# Patient Record
Sex: MALE | Race: WHITE | NOT HISPANIC OR LATINO | Employment: UNEMPLOYED | URBAN - METROPOLITAN AREA
[De-identification: names, ages, dates, MRNs, and addresses within clinical notes are randomized per-mention and may not be internally consistent; named-entity substitution may affect disease eponyms.]

---

## 2024-03-03 ENCOUNTER — HOSPITAL ENCOUNTER (EMERGENCY)
Facility: HOSPITAL | Age: 49
Discharge: HOME | End: 2024-03-03
Attending: EMERGENCY MEDICINE

## 2024-03-03 VITALS
RESPIRATION RATE: 20 BRPM | DIASTOLIC BLOOD PRESSURE: 104 MMHG | TEMPERATURE: 98.6 F | OXYGEN SATURATION: 97 % | WEIGHT: 163.3 LBS | SYSTOLIC BLOOD PRESSURE: 176 MMHG | HEART RATE: 87 BPM

## 2024-03-03 DIAGNOSIS — K08.89 PAIN, DENTAL: Primary | ICD-10-CM

## 2024-03-03 PROCEDURE — 99282 EMERGENCY DEPT VISIT SF MDM: CPT

## 2024-03-03 RX ORDER — BUPIVACAINE HYDROCHLORIDE AND EPINEPHRINE 5; 5 MG/ML; UG/ML
1.8 INJECTION, SOLUTION EPIDURAL; INTRACAUDAL; PERINEURAL ONCE
Status: COMPLETED | OUTPATIENT
Start: 2024-03-03 | End: 2024-03-03

## 2024-03-03 RX ORDER — OXYCODONE HYDROCHLORIDE AND ACETAMINOPHEN 5; 325 MG/1; MG/1
1 TABLET ORAL EVERY 6 HOURS PRN
Qty: 20 TABLET | Refills: 0 | Status: SHIPPED | OUTPATIENT
Start: 2024-03-03 | End: 2024-03-08

## 2024-03-03 RX ADMIN — BUPIVACAINE HYDROCHLORIDE AND EPINEPHRINE BITARTRATE 1.8 ML: 5; .005 INJECTION, SOLUTION SUBCUTANEOUS at 17:49

## 2024-03-03 NOTE — ED PROVIDER NOTES
History  Chief Complaint   Patient presents with    Dental Pain     Broken left lower molar. Took 800mg Ibuprofen at 330pm     48 year old male, 6 months of left lower molar pain, worse over last few days due to chip, taking 800 mg of motrin and 1000mg of tylenol. Saw dentist 6 months agoe but was unable to follow up.       Dental Pain  Associated symptoms: no fever and no neck pain        None       History reviewed. No pertinent past medical history.    History reviewed. No pertinent surgical history.    History reviewed. No pertinent family history.  I have reviewed and agree with the history as documented.    E-Cigarette/Vaping     E-Cigarette/Vaping Substances     Social History     Tobacco Use    Smoking status: Every Day     Current packs/day: 1.00     Types: Cigarettes    Smokeless tobacco: Never   Substance Use Topics    Alcohol use: Never    Drug use: Never       Review of Systems   Constitutional: Negative.  Negative for chills and fever.   HENT:  Positive for dental problem. Negative for rhinorrhea, sore throat, trouble swallowing and voice change.    Eyes: Negative.  Negative for pain and visual disturbance.   Respiratory: Negative.  Negative for cough, shortness of breath and wheezing.    Cardiovascular: Negative.  Negative for chest pain and palpitations.   Gastrointestinal:  Negative for abdominal pain, diarrhea, nausea and vomiting.   Genitourinary: Negative.  Negative for dysuria and frequency.   Musculoskeletal: Negative.  Negative for neck pain and neck stiffness.   Skin: Negative.  Negative for rash.   Neurological: Negative.  Negative for dizziness, speech difficulty, weakness, light-headedness and numbness.       Physical Exam  Physical Exam  Vitals and nursing note reviewed.   Constitutional:       General: He is not in acute distress.     Appearance: He is well-developed.   HENT:      Head: Normocephalic and atraumatic.      Mouth/Throat:      Lips: Pink.      Mouth: Mucous membranes are  moist. No lacerations or angioedema.      Dentition: Dental tenderness and dental caries present.      Tongue: Lesions present.      Pharynx: Oropharynx is clear.      Tonsils: No tonsillar exudate or tonsillar abscesses.     Eyes:      Conjunctiva/sclera: Conjunctivae normal.      Pupils: Pupils are equal, round, and reactive to light.   Neck:      Trachea: No tracheal deviation.   Cardiovascular:      Rate and Rhythm: Normal rate and regular rhythm.   Pulmonary:      Effort: Pulmonary effort is normal. No respiratory distress.      Breath sounds: Normal breath sounds. No wheezing or rales.   Abdominal:      General: Bowel sounds are normal. There is no distension.      Palpations: Abdomen is soft.      Tenderness: There is no abdominal tenderness. There is no guarding or rebound.   Musculoskeletal:         General: No tenderness or deformity. Normal range of motion.      Cervical back: Normal range of motion and neck supple.   Skin:     General: Skin is warm and dry.      Capillary Refill: Capillary refill takes less than 2 seconds.      Findings: No rash.   Neurological:      Mental Status: He is alert and oriented to person, place, and time.   Psychiatric:         Behavior: Behavior normal.         Vital Signs  ED Triage Vitals [03/03/24 1727]   Temperature Pulse Respirations Blood Pressure SpO2   98.6 °F (37 °C) 87 20 (!) 176/104 97 %      Temp Source Heart Rate Source Patient Position - Orthostatic VS BP Location FiO2 (%)   Tympanic Monitor Sitting Left arm --      Pain Score       --           Vitals:    03/03/24 1727   BP: (!) 176/104   Pulse: 87   Patient Position - Orthostatic VS: Sitting         Visual Acuity      ED Medications  Medications   bupivacaine-epinephrine (PF) (MARCAINE/EPINEPHRINE PF) 0.5 %-1:[id number] injection 1.8 mL (1.8 mL Injection Given 3/3/24 1749)       Diagnostic Studies  Results Reviewed       None                   No orders to display              Procedures  Nerve  "block    Date/Time: 3/3/2024 6:09 PM    Performed by: Je Francois DO  Authorized by: Je Francois DO    Patient location:  ED  [place] Protocol:  Consent: Verbal consent obtained. Written consent not obtained.  Risks and benefits: risks, benefits and alternatives were discussed  Consent given by: patient  Time out: Immediately prior to procedure a \"time out\" was called to verify the correct patient, procedure, equipment, support staff and site/side marked as required.  Patient understanding: patient states understanding of the procedure being performed  Relevant documents: relevant documents present and verified  Test results: test results available and properly labeled  Radiology Images displayed and confirmed. If images not available, report reviewed: imaging studies available  Required items: required blood products, implants, devices, and special equipment available  Patient identity confirmed: verbally with patient    Indications:     Indications:  Pain relief  Location:     Body area:  Head    Head nerve blocked: inferior alveolar nerve.    Nerve type:  Peripheral    Laterality:  Left  Skin anesthesia (see MAR for exact dosages):     Skin anesthesia method:  Topical application    Topical anesthetic:  Lidocaine gel  Procedure details (see MAR for exact dosages):     Block needle gauge:  25 G    Anesthetic injected:  Bupivacaine 0.25% WITH epi    Steroid injected:  None    Additive injected:  None    Injection procedure:  Anatomic landmarks identified, incremental injection, negative aspiration for blood, anatomic landmarks palpated and introduced needle  Post-procedure details:     Dressing:  None    Outcome:  Anesthesia achieved    Patient tolerance of procedure:  Tolerated well, no immediate complications           ED Course                               SBIRT 20yo+      Flowsheet Row Most Recent Value   Initial Alcohol Screen: US AUDIT-C     1. How often do you have a drink containing " alcohol? 0 Filed at: 03/03/2024 1730   2. How many drinks containing alcohol do you have on a typical day you are drinking?  0 Filed at: 03/03/2024 1730   3a. Male UNDER 65: How often do you have five or more drinks on one occasion? 0 Filed at: 03/03/2024 1730   3b. FEMALE Any Age, or MALE 65+: How often do you have 4 or more drinks on one occassion? 0 Filed at: 03/03/2024 1730   Audit-C Score 0 Filed at: 03/03/2024 1730   TRACEY: How many times in the past year have you...    Used an illegal drug or used a prescription medication for non-medical reasons? Never Filed at: 03/03/2024 1730                      Medical Decision Making  No obvious airway obstruction or concern for deep space infection. Dental block preformed. Will start on stronger analgesics, Given referral to dentist or can follow up with his dentist from 6 months ago. Return precautions reviewed.     Risk  Prescription drug management.             Disposition  Final diagnoses:   Pain, dental     Time reflects when diagnosis was documented in both MDM as applicable and the Disposition within this note       Time User Action Codes Description Comment    3/3/2024  5:52 PM Je Francois Add [K08.89] Pain, dental           ED Disposition       ED Disposition   Discharge    Condition   Stable    Date/Time   Sun Mar 3, 2024 1752    Comment   Jamil Covarrubias discharge to home/self care.                   Follow-up Information    None         Patient's Medications   Discharge Prescriptions    OXYCODONE-ACETAMINOPHEN (PERCOCET) 5-325 MG PER TABLET    Take 1 tablet by mouth every 6 (six) hours as needed for moderate pain for up to 5 days Max Daily Amount: 4 tablets       Start Date: 3/3/2024  End Date: 3/8/2024       Order Dose: 1 tablet       Quantity: 20 tablet    Refills: 0           PDMP Review       None            ED Provider  Electronically Signed by             Je Francois DO  03/03/24 4637

## 2024-03-03 NOTE — Clinical Note
Jamil Covarrubias was seen and treated in our emergency department on 3/3/2024.                Diagnosis:     Jamil  .    He may return on this date: 03/07/2024         If you have any questions or concerns, please don't hesitate to call.      Je Francois, DO    ______________________________           _______________          _______________  Hospital Representative                              Date                                Time

## 2024-03-04 ENCOUNTER — OFFICE VISIT (OUTPATIENT)
Dept: DENTISTRY | Facility: CLINIC | Age: 49
End: 2024-03-04

## 2024-03-04 VITALS — HEART RATE: 97 BPM | DIASTOLIC BLOOD PRESSURE: 88 MMHG | SYSTOLIC BLOOD PRESSURE: 135 MMHG

## 2024-03-04 DIAGNOSIS — Z01.20 ENCOUNTER FOR DENTAL EXAMINATION: Primary | ICD-10-CM

## 2024-03-04 PROCEDURE — D0220 INTRAORAL - PERIAPICAL FIRST RADIOGRAPHIC IMAGE: HCPCS

## 2024-03-04 PROCEDURE — D0140 LIMITED ORAL EVALUATION - PROBLEM FOCUSED: HCPCS

## 2024-03-04 NOTE — DENTAL PROCEDURE DETAILS
Patient presents for limited dental evaluation.    Patient was seen in ED yesterday for dental pain.    PMH reviewed, patient reports no medical conditions. Patient is a smoker about 1 pack a day.    CC - Lower left pain, points to #18    Clinical Findings  #18 MOBL cavitation, nonrestorable hopeless carious tooth; percussion and palpation tender;  PA radiograph #18--large caries, PARL  Other teeth with caries  Other missing teeth  Severe dental crowding  Generalized calculus and plaque and gingival inflammation  Generalized black/brown enamel staining  Soft tissue/oral cancer screening-Normal    Plan: EXT #18 and comp exam to address other needs.    Patient informed of findings and plan to extract #18 and comp exam for other needs. Patient informed of other treatment needs. Patient informed to continue taking antibiotics to completion. Patient informed that he will need to stop smoking for at least 2 days following the tooth extraction to avoid delayed healing/dry socket. Patient understood and agreed.    NV: EXT #18  NV2: Comp exam

## 2024-09-20 ENCOUNTER — OCCMED (OUTPATIENT)
Dept: URGENT CARE | Age: 49
End: 2024-09-20
Payer: OTHER MISCELLANEOUS

## 2024-09-20 ENCOUNTER — APPOINTMENT (OUTPATIENT)
Dept: RADIOLOGY | Age: 49
End: 2024-09-20
Payer: OTHER MISCELLANEOUS

## 2024-09-20 DIAGNOSIS — S69.91XA THUMB INJURY, RIGHT, INITIAL ENCOUNTER: Primary | ICD-10-CM

## 2024-09-20 DIAGNOSIS — S69.91XA THUMB INJURY, RIGHT, INITIAL ENCOUNTER: ICD-10-CM

## 2024-09-20 PROCEDURE — 99283 EMERGENCY DEPT VISIT LOW MDM: CPT

## 2024-09-20 PROCEDURE — 73130 X-RAY EXAM OF HAND: CPT

## 2024-09-20 PROCEDURE — G0382 LEV 3 HOSP TYPE B ED VISIT: HCPCS

## 2024-09-24 ENCOUNTER — APPOINTMENT (OUTPATIENT)
Age: 49
End: 2024-09-24
Payer: OTHER MISCELLANEOUS

## 2024-09-24 PROCEDURE — 99214 OFFICE O/P EST MOD 30 MIN: CPT | Performed by: STUDENT IN AN ORGANIZED HEALTH CARE EDUCATION/TRAINING PROGRAM

## 2024-10-01 ENCOUNTER — APPOINTMENT (OUTPATIENT)
Age: 49
End: 2024-10-01
Payer: OTHER MISCELLANEOUS

## 2024-10-01 PROCEDURE — 99213 OFFICE O/P EST LOW 20 MIN: CPT | Performed by: STUDENT IN AN ORGANIZED HEALTH CARE EDUCATION/TRAINING PROGRAM

## 2024-10-15 ENCOUNTER — APPOINTMENT (OUTPATIENT)
Age: 49
End: 2024-10-15
Payer: OTHER MISCELLANEOUS

## 2024-10-15 PROCEDURE — 99214 OFFICE O/P EST MOD 30 MIN: CPT | Performed by: STUDENT IN AN ORGANIZED HEALTH CARE EDUCATION/TRAINING PROGRAM

## 2024-11-05 ENCOUNTER — APPOINTMENT (OUTPATIENT)
Age: 49
End: 2024-11-05
Payer: OTHER MISCELLANEOUS

## 2024-11-05 PROCEDURE — 99214 OFFICE O/P EST MOD 30 MIN: CPT | Performed by: STUDENT IN AN ORGANIZED HEALTH CARE EDUCATION/TRAINING PROGRAM

## 2024-11-13 ENCOUNTER — OFFICE VISIT (OUTPATIENT)
Dept: OBGYN CLINIC | Facility: CLINIC | Age: 49
End: 2024-11-13
Payer: OTHER MISCELLANEOUS

## 2024-11-13 VITALS — SYSTOLIC BLOOD PRESSURE: 100 MMHG | WEIGHT: 163 LBS | DIASTOLIC BLOOD PRESSURE: 60 MMHG

## 2024-11-13 DIAGNOSIS — S63.681A OTHER SPRAIN OF RIGHT THUMB, INITIAL ENCOUNTER: Primary | ICD-10-CM

## 2024-11-13 PROCEDURE — 99213 OFFICE O/P EST LOW 20 MIN: CPT | Performed by: SURGERY

## 2024-11-13 NOTE — PROGRESS NOTES
Assessment    Right thumb MCP sprain.  Clinically no tear of the RCL, and intact ligament visualized on MRI.      Plan    -At this point, recommend weaning out of thumb spica brace and starting more aggressive motion and normal daily activities, and increase as able.  No specific restrictions.  -Referral to OT/hand therapy to assist with this.  -Over the counter pain medications as needed.  -Work note provided.  -Follow-up in 4 weeks for reevaluation.        Subjective     HPI    Patient ID:  Jamil Covarrubias is a right-hand-dominant 49 y.o. male here for evaluation of the right thumb.  According to the patient, about 2 months ago he was moving branches and 1 fell, hyperextending the right thumb.  He felt immediate pain and swelling.  He eventually had x-rays showing no acute fracture or dislocation, and he was placed in a thumb spica brace.  Over this timeframe his pain persisted and in MRI of the right thumb was ordered through occupational medicine.  It revealed a tear in the thumb and he was referred here.  He states today he has pain in the thumb MCP region as well as the radial and dorsal wrist.  He has been wearing the thumb spica brace consistently for about 20 hours/day.  He is taking over-the-counter pain medications as needed consistently as well.  His pain is about 6/10 on a daily basis.  No associated numbness and tingling.  No history of surgery to the right thumb.      The following portions of the patient's history were reviewed and updated as appropriate: allergies, current medications, past family history, past medical history, past social history, past surgical history, and problem list.    Review of Systems     Objective    Imaging:   MRI Right thumb without contrast 10/31/2024:  RCL tear     Right thumb x-rays 9/20/2024    FINDINGS:     No acute fracture or dislocation.     No significant degenerative changes.     No lytic or blastic osseous lesion.     Unremarkable soft tissues.      IMPRESSION:     No acute osseous abnormality.    Physical Exam     Vitals:    11/13/24 1021   BP: 100/60       General appearance:  NAD   Cardiac:  Regular rate  Lungs:  Unlabored breathing  Abdomen:  Non-distended    Orthopedic Examination:  Right thumb    Inspection: No open wounds or erythema.  No ecchymosis or swelling.  No obvious bony abnormality.    Palpation: Mild tenderness to palpation RCL region, and to a lesser extent UCL.  There is some tenderness first dorsal extensor compartment as well as second dorsal compartment.    Range-of-motion:  + Active thumb flexion extension    Strength:  5/5     Sensation:  SILT radial and ulnar side of the thumb.      Special Tests:  Palpable radial pulse  No laxity with stress of RCL/UCL.

## 2024-11-13 NOTE — LETTER
November 13, 2024     Patient: Jamil Covrarubias  YOB: 1975  Date of Visit: 11/13/2024      To Whom it May Concern:    Jamil Covarrubias is under my professional care. Jamil was seen in my office on 11/13/2024. Jamil is to continue current work restrictions until next visit in 4 weeks.  Further updates will be provided then.      If you have any questions or concerns, please don't hesitate to call.         Sincerely,          Vinny Lange MD        CC: No Recipients

## 2024-11-14 ENCOUNTER — APPOINTMENT (OUTPATIENT)
Age: 49
End: 2024-11-14
Payer: OTHER MISCELLANEOUS

## 2024-11-14 PROCEDURE — 99214 OFFICE O/P EST MOD 30 MIN: CPT | Performed by: STUDENT IN AN ORGANIZED HEALTH CARE EDUCATION/TRAINING PROGRAM

## 2024-11-18 ENCOUNTER — TELEPHONE (OUTPATIENT)
Age: 49
End: 2024-11-18

## 2024-11-18 NOTE — TELEPHONE ENCOUNTER
Caller: Ren millan PT    Doctor/Office: Nazario BARTLETT#:       What needs to be faxed: 11/13 OVN    ATTN to: Ren    Fax#: [phone]      Documents were successfully e-faxed

## 2024-12-11 ENCOUNTER — OFFICE VISIT (OUTPATIENT)
Dept: OBGYN CLINIC | Facility: CLINIC | Age: 49
End: 2024-12-11
Payer: OTHER MISCELLANEOUS

## 2024-12-11 VITALS — DIASTOLIC BLOOD PRESSURE: 60 MMHG | SYSTOLIC BLOOD PRESSURE: 116 MMHG | WEIGHT: 163 LBS

## 2024-12-11 DIAGNOSIS — S63.641D SPRAIN OF METACARPOPHALANGEAL (MCP) JOINT OF RIGHT THUMB, SUBSEQUENT ENCOUNTER: Primary | ICD-10-CM

## 2024-12-11 PROCEDURE — 99213 OFFICE O/P EST LOW 20 MIN: CPT | Performed by: SURGERY

## 2024-12-11 RX ORDER — FAMOTIDINE 40 MG/1
40 TABLET, FILM COATED ORAL DAILY
COMMUNITY
End: 2024-12-16

## 2024-12-11 RX ORDER — IBUPROFEN 200 MG
200 TABLET ORAL DAILY
COMMUNITY

## 2024-12-11 NOTE — LETTER
December 11, 2024     Patient: Jamil Covarrubias  YOB: 1975  Date of Visit: 12/11/2024      To Whom it May Concern:    Jamil Covarrubias is under my professional care. Jamil was seen in my office on 12/11/2024. Jamil may return to work on 12/11/2024. He may be as active at work as he can tolerate but please allow him to refrain from heavier duties if he has thumb pain.    If you have any questions or concerns, please don't hesitate to call.         Sincerely,          Vinny Lange MD

## 2024-12-11 NOTE — PROGRESS NOTES
Assessment    Right thumb MCP sprain.  Clinically no tear of the RCL, and intact ligament visualized on MRI.      Plan    -Doing well.  Thumb is stable.  Some dorsal, ulnar sensory nerve irritation at the thumb MCP joint level.    -No restrictions from orthopedics standpoint  -Activities as tolerated  -Can cont therapy. May be as aggressive as he can tolerated with therapy  -NSAIDs as needed  -Work note provided  -F/U PRN          Subjective     HPI    Patient ID:  Jamil Covarrubias is a right-hand-dominant 49 y.o. male here for follow up of the right thumb.    Since last visit patient states that his thumb is doing better. He still has pain of 4-5/10 with some activities or motions of the thumb but no pain at rest. He is currently still attending formal therpay and it has helped  He feels he is going in the right direction with his thumb      Past HPI  According to the patient, about 2 months ago he was moving branches and 1 fell, hyperextending the right thumb.  He felt immediate pain and swelling.  He eventually had x-rays showing no acute fracture or dislocation, and he was placed in a thumb spica brace.  Over this timeframe his pain persisted and in MRI of the right thumb was ordered through occupational medicine.  It revealed a tear in the thumb and he was referred here.  He states today he has pain in the thumb MCP region as well as the radial and dorsal wrist.  He has been wearing the thumb spica brace consistently for about 20 hours/day.  He is taking over-the-counter pain medications as needed consistently as well.  His pain is about 6/10 on a daily basis.  No associated numbness and tingling.  No history of surgery to the right thumb.      The following portions of the patient's history were reviewed and updated as appropriate: allergies, current medications, past family history, past medical history, past social history, past surgical history, and problem list.    Review of Systems     Objective      No  new images obtained/reviewed      Imaging:   MRI Right thumb without contrast 10/31/2024:  RCL tear     Right thumb x-rays 9/20/2024    FINDINGS:     No acute fracture or dislocation.     No significant degenerative changes.     No lytic or blastic osseous lesion.     Unremarkable soft tissues.     IMPRESSION:     No acute osseous abnormality.    Physical Exam     Vitals:    12/11/24 1453   BP: 116/60       General appearance:  NAD   Cardiac:  Regular rate  Lungs:  Unlabored breathing  Abdomen:  Non-distended    Orthopedic Examination:  Right thumb    Inspection: No open wounds or erythema.  No ecchymosis or swelling.  No obvious bony abnormality.    Palpation: Mild tenderness to palpation RCL region, and to a lesser extent UCL.     Range-of-motion:  + Active thumb flexion extension    Strength:  5/5     Sensation:  SILT radial and ulnar side of the thumb.      Special Tests:  Palpable radial pulse  No laxity with stress of RCL/UCL.  Some dorsal, ulnar thumb sensory nerve sensitivity on palpation

## 2024-12-16 ENCOUNTER — OFFICE VISIT (OUTPATIENT)
Dept: FAMILY MEDICINE CLINIC | Facility: CLINIC | Age: 49
End: 2024-12-16
Payer: COMMERCIAL

## 2024-12-16 VITALS
WEIGHT: 160 LBS | HEART RATE: 91 BPM | DIASTOLIC BLOOD PRESSURE: 70 MMHG | OXYGEN SATURATION: 98 % | HEIGHT: 65 IN | BODY MASS INDEX: 26.66 KG/M2 | SYSTOLIC BLOOD PRESSURE: 110 MMHG | TEMPERATURE: 97.4 F

## 2024-12-16 DIAGNOSIS — Z11.4 ENCOUNTER FOR SCREENING FOR HIV: ICD-10-CM

## 2024-12-16 DIAGNOSIS — Z11.59 NEED FOR HEPATITIS C SCREENING TEST: ICD-10-CM

## 2024-12-16 DIAGNOSIS — Z00.00 ANNUAL PHYSICAL EXAM: Primary | ICD-10-CM

## 2024-12-16 DIAGNOSIS — M54.50 BILATERAL LOW BACK PAIN WITHOUT SCIATICA, UNSPECIFIED CHRONICITY: ICD-10-CM

## 2024-12-16 PROCEDURE — 99213 OFFICE O/P EST LOW 20 MIN: CPT

## 2024-12-16 PROCEDURE — 99386 PREV VISIT NEW AGE 40-64: CPT

## 2024-12-16 RX ORDER — METHOCARBAMOL 750 MG/1
750 TABLET, FILM COATED ORAL 3 TIMES DAILY
Qty: 30 TABLET | Refills: 0 | Status: SHIPPED | OUTPATIENT
Start: 2024-12-16
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
## 2024-12-17 ENCOUNTER — TELEPHONE (OUTPATIENT)
Age: 49
End: 2024-12-17

## 2024-12-17 NOTE — TELEPHONE ENCOUNTER
Caller: [facility]    Doctor/Office: Nazario BARTLETT#: [phone]      What needs to be faxed: TERRANCE from 12/11/2024    ATTN to: Ren    Fax#: [phone]       Documents were successfully e-faxed

## 2024-12-20 ENCOUNTER — APPOINTMENT (OUTPATIENT)
Age: 49
End: 2024-12-20
Payer: OTHER MISCELLANEOUS

## 2024-12-20 PROCEDURE — 99214 OFFICE O/P EST MOD 30 MIN: CPT | Performed by: STUDENT IN AN ORGANIZED HEALTH CARE EDUCATION/TRAINING PROGRAM

## 2025-03-26 ENCOUNTER — OFFICE VISIT (OUTPATIENT)
Dept: FAMILY MEDICINE CLINIC | Facility: CLINIC | Age: 50
End: 2025-03-26
Payer: COMMERCIAL

## 2025-03-26 VITALS
HEIGHT: 65 IN | DIASTOLIC BLOOD PRESSURE: 62 MMHG | TEMPERATURE: 97.3 F | WEIGHT: 158.8 LBS | OXYGEN SATURATION: 99 % | BODY MASS INDEX: 26.46 KG/M2 | HEART RATE: 92 BPM | SYSTOLIC BLOOD PRESSURE: 104 MMHG

## 2025-03-26 DIAGNOSIS — R42 DIZZINESS: Primary | ICD-10-CM

## 2025-03-26 DIAGNOSIS — F17.210 CIGARETTE SMOKER: ICD-10-CM

## 2025-03-26 DIAGNOSIS — R09.81 NASAL CONGESTION: ICD-10-CM

## 2025-03-26 DIAGNOSIS — I95.9 HYPOTENSIVE EPISODE: ICD-10-CM

## 2025-03-26 DIAGNOSIS — J30.2 SEASONAL ALLERGIES: ICD-10-CM

## 2025-03-26 PROCEDURE — 99214 OFFICE O/P EST MOD 30 MIN: CPT

## 2025-03-26 NOTE — PROGRESS NOTES
Name: Jamil Covarrubias      : 1975      MRN: [id number]  Encounter Provider: Jarrett Munoz MD  Encounter Date: 3/26/2025   Encounter department: [facility]    Assessment & Plan  Dizziness  Likely due to hypotension and dehydration     PLAN:   - advised to drink more fluids and keep hydrated  - Have more regulated balanced meals  - Blood work to rule out any additional physiological cause   - Strict Ed precautions  Orders:  •  CBC and differential; Future  •  Comprehensive metabolic panel; Future  •  TSH, 3rd generation with Free T4 reflex; Future  •  Lipid panel; Future    Nasal congestion  Currently on oxymetazoline   Has been using this for multiple years and is unable          Seasonal allergies    Orders:  •  Ambulatory Referral to Allergy; Future    Hypotensive episode    Orders:  •  Lipid panel; Future    Cigarette smoker  Would like to quit but would like to address the other things at this time.   Can start with Nicorette to decrease smoking   1.5 ppd  Return in 1 month   Orders:  •  nicotine polacrilex (NICORETTE) 4 mg gum; Chew 1 each (4 mg total) as needed for smoking cessation  •  Ambulatory Referral to Smoking Cessation Program; Future         History of Present Illness     Jamil Covarrubias is a 49 y.o. male presenting today for complaints of dizziness and nasal congestion    Has had episodes of dizziness with hypotension. With blood pressures 90/60 in last episode. Does report he drinks very little water and his eating is sporadic as well, misses breakfast often.     Dizziness  This is a new problem. The current episode started in the past 7 days. The problem occurs intermittently. Pertinent negatives include no abdominal pain, anorexia, arthralgias, change in bowel habit, chest pain, chills, coughing, fever, myalgias, nausea, neck pain, numbness, rash, sore throat, swollen glands, urinary symptoms, vertigo, visual change, vomiting or weakness. Nothing  "aggravates the symptoms. He has tried nothing for the symptoms.     Review of Systems   Constitutional:  Negative for chills and fever.   HENT:  Negative for ear pain and sore throat.    Eyes:  Negative for pain and visual disturbance.   Respiratory:  Negative for cough and shortness of breath.    Cardiovascular:  Negative for chest pain and palpitations.   Gastrointestinal:  Negative for abdominal pain, anorexia, change in bowel habit, nausea and vomiting.   Genitourinary:  Negative for dysuria and hematuria.   Musculoskeletal:  Negative for arthralgias, back pain, myalgias and neck pain.   Skin:  Negative for color change and rash.   Neurological:  Positive for dizziness. Negative for vertigo, seizures, syncope, weakness and numbness.   All other systems reviewed and are negative.    History reviewed. No pertinent past medical history.  History reviewed. No pertinent surgical history.  History reviewed. No pertinent family history.  Social History     Tobacco Use   • Smoking status: Every Day     Current packs/day: 1.00     Types: Cigarettes     Passive exposure: Current   • Smokeless tobacco: Never   Vaping Use   • Vaping status: Never Used   Substance and Sexual Activity   • Alcohol use: Never   • Drug use: Never   • Sexual activity: Not Currently     Current Outpatient Medications on File Prior to Visit   Medication Sig   • ibuprofen (MOTRIN) 200 mg tablet Take 200 mg by mouth daily   • methocarbamol (ROBAXIN) 750 mg tablet Take 1 tablet (750 mg total) by mouth 3 (three) times a day (Patient not taking: Reported on 3/26/2025)     Allergies   Allergen Reactions   • Penicillins Rash     Immunization History   Administered Date(s) Administered   • COVID-19 PFIZER VACCINE 0.3 ML IM 03/14/2021, 04/05/2021     Objective   /62 (BP Location: Left arm, Patient Position: Sitting, Cuff Size: Adult)   Pulse 92   Temp (!) 97.3 °F (36.3 °C)   Ht 5' 5\" (1.651 m)   Wt 72 kg (158 lb 12.8 oz)   SpO2 99%   BMI 26.43 " kg/m²     Physical Exam  Vitals and nursing note reviewed.   Constitutional:       General: He is not in acute distress.     Appearance: He is well-developed.   HENT:      Head: Normocephalic and atraumatic.   Eyes:      Conjunctiva/sclera: Conjunctivae normal.   Cardiovascular:      Rate and Rhythm: Normal rate and regular rhythm.      Heart sounds: No murmur heard.  Pulmonary:      Effort: Pulmonary effort is normal. No respiratory distress.      Breath sounds: Normal breath sounds.   Abdominal:      Palpations: Abdomen is soft.      Tenderness: There is no abdominal tenderness.   Musculoskeletal:         General: No swelling.      Cervical back: Neck supple.   Skin:     General: Skin is warm and dry.      Capillary Refill: Capillary refill takes less than 2 seconds.   Neurological:      Mental Status: He is alert.      Cranial Nerves: Cranial nerves 2-12 are intact.      Motor: Motor function is intact.      Coordination: Coordination is intact.      Gait: Gait is intact.   Psychiatric:         Mood and Affect: Mood normal.